# Patient Record
Sex: FEMALE | Race: WHITE | URBAN - METROPOLITAN AREA
[De-identification: names, ages, dates, MRNs, and addresses within clinical notes are randomized per-mention and may not be internally consistent; named-entity substitution may affect disease eponyms.]

---

## 2022-07-13 ENCOUNTER — OFFICE VISIT (OUTPATIENT)
Dept: SURGERY | Age: 42
End: 2022-07-13
Payer: COMMERCIAL

## 2022-07-13 VITALS
BODY MASS INDEX: 25.05 KG/M2 | SYSTOLIC BLOOD PRESSURE: 146 MMHG | HEIGHT: 70 IN | WEIGHT: 175 LBS | DIASTOLIC BLOOD PRESSURE: 90 MMHG | HEART RATE: 78 BPM

## 2022-07-13 DIAGNOSIS — R22.2 MASS OF SUBCUTANEOUS TISSUE OF BACK: Primary | ICD-10-CM

## 2022-07-13 PROCEDURE — 99204 OFFICE O/P NEW MOD 45 MIN: CPT | Performed by: STUDENT IN AN ORGANIZED HEALTH CARE EDUCATION/TRAINING PROGRAM

## 2022-07-13 PROCEDURE — 11403 EXC TR-EXT B9+MARG 2.1-3CM: CPT | Performed by: STUDENT IN AN ORGANIZED HEALTH CARE EDUCATION/TRAINING PROGRAM

## 2022-07-14 NOTE — PROGRESS NOTES
strokes\"       Social History     Socioeconomic History    Marital status: Single     Spouse name: None    Number of children: None    Years of education: None    Highest education level: None   Occupational History    None   Tobacco Use    Smoking status: Never Smoker    Smokeless tobacco: Never Used   Substance and Sexual Activity    Alcohol use: Yes    Drug use: No    Sexual activity: None     Comment: virgin   Other Topics Concern    None   Social History Narrative    None     Social Determinants of Health     Financial Resource Strain:     Difficulty of Paying Living Expenses: Not on file   Food Insecurity:     Worried About Running Out of Food in the Last Year: Not on file    Terence of Food in the Last Year: Not on file   Transportation Needs:     Lack of Transportation (Medical): Not on file    Lack of Transportation (Non-Medical):  Not on file   Physical Activity:     Days of Exercise per Week: Not on file    Minutes of Exercise per Session: Not on file   Stress:     Feeling of Stress : Not on file   Social Connections:     Frequency of Communication with Friends and Family: Not on file    Frequency of Social Gatherings with Friends and Family: Not on file    Attends Mandaeism Services: Not on file    Active Member of [address] [place] [address] or Organizations: Not on file    Attends Club or Organization Meetings: Not on file    Marital Status: Not on file   Intimate Partner Violence:     Fear of Current or Ex-Partner: Not on file    Emotionally Abused: Not on file    Physically Abused: Not on file    Sexually Abused: Not on file   Housing Stability:     Unable to Pay for Housing in the Last Year: Not on file    Number of [place] in the Last Year: Not on file    Unstable Housing in the Last Year: Not on file         PHYSICAL EXAMINATION:    BP (!) 146/90   Pulse 78   Ht 5' 10\" (1.778 m)   Wt 175 lb (79.4 kg)   LMP 07/11/2022   BMI 25.11 kg/m²     General Appearance AOx3, in no acute distress  Eyes Conjunctivae/corneas clear. PERRL, EOM's intact. No scleral icterus  Ears, Nose, Throat ENT exam normal, no neck nodes or sinus tenderness  Neck supple, no significant adenopathy. No notable JVD  Respiratory No chest wall deformities or tenderness, respiratory effort normal, no use of accessory muscles. Cardiovascular RRR. No chest wall tenderness. Gastrointestinal Abdomen soft, nontender, nondistended. BS x4. No rebound, guarding, or rigidity present. No palpable masses. No CVA tenderness  Lymphatics No palpable lymphadenopathy, no hepatosplenomegaly  Musculoskeletal No joint tenderness, deformity or swelling. Full ROM UE/LE. Distal pulses intact UE/LE. No edema, cyanosis, or venous stasis changes. Skin Normal coloration and turgor, no rashes, no suspicious skin lesions noted. Back mass ~3x2cm  Neurological alert, oriented, normal speech, no focal findings or movement disorder noted, CN II-XII intact  Psychiatric Alert and oriented, appropriate affect      STUDIES: normal    IMPRESSION:  Back mass    PLAN:  Plan for excision of back mass. Risks of surgery discussed with pt and/or family present include risks of anesthesia (cardiopulmonary complications), MI, CVA, DVT,pain, bleeding, infection, injury to local viscera, wound problems, conversion to open procedure if laparoscopic procedure planned, and incomplete symptom resolution. They understand and agree to proceed. DESCRIPTION OF PROCEDURE:   Consent was confirmed and placed on the chart. The patient's was then prepped and draped in the usual sterile fashion. The procedure began by making an incision over the apex of the mass. Scalpel was used to sharply dissect through the subcuticular tissue to the capsule of the mass. At this point hemostat was used to bluntly dissect circumferentially around the mass and its capsule. It was removed in its entirety. Further inspection of the cavity noted no other acute abnormalities.  Hemostasis was achieved with electric cautery. The wound was irrigated with normal saline. 3-0 Monocryl suture was used to close the skin in a simple interrupted fashion. dermabond was placed on the incision. At this time, the procedure was complete.  The patient tolerated procedure well

## 2023-01-24 ENCOUNTER — OFFICE VISIT (OUTPATIENT)
Dept: OBGYN CLINIC | Age: 43
End: 2023-01-24
Payer: COMMERCIAL

## 2023-01-24 VITALS — SYSTOLIC BLOOD PRESSURE: 140 MMHG | WEIGHT: 180 LBS | BODY MASS INDEX: 25.83 KG/M2 | DIASTOLIC BLOOD PRESSURE: 90 MMHG

## 2023-01-24 DIAGNOSIS — D25.1 INTRAMURAL AND SUBSEROUS LEIOMYOMA OF UTERUS: Primary | ICD-10-CM

## 2023-01-24 DIAGNOSIS — N94.6 DYSMENORRHEA, UNSPECIFIED: ICD-10-CM

## 2023-01-24 DIAGNOSIS — D25.2 INTRAMURAL AND SUBSEROUS LEIOMYOMA OF UTERUS: Primary | ICD-10-CM

## 2023-01-24 PROCEDURE — 3078F DIAST BP <80 MM HG: CPT | Performed by: OBSTETRICS & GYNECOLOGY

## 2023-01-24 PROCEDURE — 3074F SYST BP LT 130 MM HG: CPT | Performed by: OBSTETRICS & GYNECOLOGY

## 2023-01-24 PROCEDURE — 99214 OFFICE O/P EST MOD 30 MIN: CPT | Performed by: OBSTETRICS & GYNECOLOGY

## 2023-01-24 PROCEDURE — 76830 TRANSVAGINAL US NON-OB: CPT | Performed by: OBSTETRICS & GYNECOLOGY

## 2023-01-24 RX ORDER — LEVOTHYROXINE SODIUM 0.12 MG/1
TABLET ORAL
COMMUNITY

## 2023-01-24 RX ORDER — ELETRIPTAN HYDROBROMIDE 40 MG/1
TABLET, FILM COATED ORAL
COMMUNITY
Start: 2022-12-24

## 2023-01-24 RX ORDER — AZELASTINE 1 MG/ML
SPRAY, METERED NASAL
COMMUNITY

## 2023-01-24 RX ORDER — SULFACETAMIDE SODIUM, SULFUR 100; 50 MG/G; MG/G
SUSPENSION TOPICAL
COMMUNITY

## 2023-01-24 RX ORDER — TEMAZEPAM 30 MG/1
CAPSULE ORAL
COMMUNITY
Start: 2022-12-29

## 2023-01-24 RX ORDER — TRAZODONE HYDROCHLORIDE 50 MG/1
TABLET ORAL
COMMUNITY

## 2023-01-24 RX ORDER — KETOROLAC TROMETHAMINE 30 MG/ML
2 INJECTION, SOLUTION INTRAMUSCULAR; INTRAVENOUS
COMMUNITY

## 2023-01-24 RX ORDER — FLUTICASONE PROPIONATE 50 MCG
SPRAY, SUSPENSION (ML) NASAL
COMMUNITY

## 2023-01-24 NOTE — PROGRESS NOTES
Back for follow up d/t symptomatic posterior fijbroid, back pain, irregular bleeding and dysmenorrhea despite Ocs. Gradually worsening, severe, Can hit hard and intense, even with minimal activity eg walking. Patient's last menstrual period was 01/18/2023. No other changes to PMH. Prev CT 4/2022 showed a 6.4 x 3.8 x 4.4 cm posterior SS fibroid. Us obtained and images reviewed. The [place] is enlarged with IM/slightly ss type 5-6 fibroid measuring now 6.6 x 4.6 x 5.2 cms. ES is WNL @ 4mm. Ovaries appear WNL. Trace FF only. Maria De Jesus Braden was seen today for follow-up and pelvic pain. Diagnoses and all orders for this visit:    Subserosal/ Intramural leiomyoma of uterus - has increased slightly in size compared to previous imaging 8 months ago, with increase in symptoms. Discussed medical & surgical options again, rec robotic myomectomy, procedure risks and recovery reviewed. -     AMB POC US, TRANSVAGINAL    Dysmenorrhea, unspecified  -     AMB POC US, TRANSVAGINAL         No follow-ups on file.     Basilia Snowden MD

## 2023-01-25 ENCOUNTER — CLINICAL DOCUMENTATION (OUTPATIENT)
Dept: OBGYN CLINIC | Age: 43
End: 2023-01-25

## 2023-02-15 PROBLEM — D25.1 INTRAMURAL LEIOMYOMA OF UTERUS: Status: ACTIVE | Noted: 2023-02-15

## 2023-04-11 RX ORDER — AMOXICILLIN 250 MG/1
250 CAPSULE ORAL NIGHTLY
COMMUNITY

## 2023-04-11 RX ORDER — ETODOLAC 500 MG/1
500 TABLET, FILM COATED ORAL DAILY
COMMUNITY

## 2023-04-14 ENCOUNTER — OFFICE VISIT (OUTPATIENT)
Dept: OBGYN CLINIC | Age: 43
End: 2023-04-14

## 2023-04-14 VITALS — BODY MASS INDEX: 26.73 KG/M2 | WEIGHT: 181 LBS | DIASTOLIC BLOOD PRESSURE: 82 MMHG | SYSTOLIC BLOOD PRESSURE: 130 MMHG

## 2023-04-14 DIAGNOSIS — D25.1 INTRAMURAL AND SUBSEROUS LEIOMYOMA OF UTERUS: Primary | ICD-10-CM

## 2023-04-14 DIAGNOSIS — D25.2 INTRAMURAL AND SUBSEROUS LEIOMYOMA OF UTERUS: Primary | ICD-10-CM

## 2023-04-14 SDOH — ECONOMIC STABILITY: FOOD INSECURITY: WITHIN THE PAST 12 MONTHS, THE FOOD YOU BOUGHT JUST DIDN'T LAST AND YOU DIDN'T HAVE MONEY TO GET MORE.: NEVER TRUE

## 2023-04-14 SDOH — ECONOMIC STABILITY: FOOD INSECURITY: WITHIN THE PAST 12 MONTHS, YOU WORRIED THAT YOUR FOOD WOULD RUN OUT BEFORE YOU GOT MONEY TO BUY MORE.: NEVER TRUE

## 2023-04-14 SDOH — ECONOMIC STABILITY: INCOME INSECURITY: HOW HARD IS IT FOR YOU TO PAY FOR THE VERY BASICS LIKE FOOD, HOUSING, MEDICAL CARE, AND HEATING?: NOT HARD AT ALL

## 2023-04-14 SDOH — ECONOMIC STABILITY: HOUSING INSECURITY
IN THE LAST 12 MONTHS, WAS THERE A TIME WHEN YOU DID NOT HAVE A STEADY PLACE TO SLEEP OR SLEPT IN A SHELTER (INCLUDING NOW)?: NO

## 2023-04-14 ASSESSMENT — PATIENT HEALTH QUESTIONNAIRE - PHQ9
6. FEELING BAD ABOUT YOURSELF - OR THAT YOU ARE A FAILURE OR HAVE LET YOURSELF OR YOUR FAMILY DOWN: 0
1. LITTLE INTEREST OR PLEASURE IN DOING THINGS: 0
7. TROUBLE CONCENTRATING ON THINGS, SUCH AS READING THE NEWSPAPER OR WATCHING TELEVISION: 0
SUM OF ALL RESPONSES TO PHQ QUESTIONS 1-9: 0
SUM OF ALL RESPONSES TO PHQ9 QUESTIONS 1 & 2: 0
10. IF YOU CHECKED OFF ANY PROBLEMS, HOW DIFFICULT HAVE THESE PROBLEMS MADE IT FOR YOU TO DO YOUR WORK, TAKE CARE OF THINGS AT HOME, OR GET ALONG WITH OTHER PEOPLE: 0
4. FEELING TIRED OR HAVING LITTLE ENERGY: 0
5. POOR APPETITE OR OVEREATING: 0
8. MOVING OR SPEAKING SO SLOWLY THAT OTHER PEOPLE COULD HAVE NOTICED. OR THE OPPOSITE, BEING SO FIGETY OR RESTLESS THAT YOU HAVE BEEN MOVING AROUND A LOT MORE THAN USUAL: 0
9. THOUGHTS THAT YOU WOULD BE BETTER OFF DEAD, OR OF HURTING YOURSELF: 0
2. FEELING DOWN, DEPRESSED OR HOPELESS: 0
3. TROUBLE FALLING OR STAYING ASLEEP: 0
SUM OF ALL RESPONSES TO PHQ QUESTIONS 1-9: 0

## 2023-04-18 ENCOUNTER — HOSPITAL ENCOUNTER (OUTPATIENT)
Dept: LAB | Age: 43
Discharge: HOME | End: 2023-04-21

## 2023-04-18 DIAGNOSIS — Z01.818 PRE-OP TESTING: ICD-10-CM

## 2023-04-18 RX ORDER — SODIUM CHLORIDE 0.9 % (FLUSH) 0.9 %
5-40 SYRINGE (ML) INJECTION PRN
Status: CANCELLED | OUTPATIENT
Start: 2023-04-18

## 2023-04-18 RX ORDER — SODIUM CHLORIDE 9 MG/ML
INJECTION, SOLUTION INTRAVENOUS PRN
Status: CANCELLED | OUTPATIENT
Start: 2023-04-18

## 2023-04-18 RX ORDER — SODIUM CHLORIDE 0.9 % (FLUSH) 0.9 %
5-40 SYRINGE (ML) INJECTION EVERY 12 HOURS SCHEDULED
Status: CANCELLED | OUTPATIENT
Start: 2023-04-18

## 2023-04-18 RX ORDER — ACETAMINOPHEN 325 MG/1
1000 TABLET ORAL ONCE
Status: CANCELLED | OUTPATIENT
Start: 2023-04-18 | End: 2023-04-18

## 2023-04-18 NOTE — PERIOP NOTE
Patient presented to Outpatient Lab for preop Hgb.  states she was unsuccessful x 2 to obtain specimen. Will collect DOS.

## 2023-04-18 NOTE — H&P (VIEW-ONLY)
bedtime   Yes Ar Automatic Reconciliation   montelukast (SINGULAIR) 10 MG tablet Take 1 tablet by mouth nightly   Yes Ar Automatic Reconciliation   norgestimate-ethinyl estradiol (ORTHO-CYCLEN) 0.25-35 MG-MCG per tablet Take 1 tablet by mouth at bedtime 3/22/16  Yes Ar Automatic Reconciliation   sertraline (ZOLOFT) 100 MG tablet Take 2 tablets by mouth daily   Yes Ar Automatic Reconciliation   Glucosamine HCl (GLUCOSAMINE PO) Take by mouth  Patient not taking: Reported on 4/14/2023    Historical Provider, MD   ketorolac (TORADOL) 60 MG/2ML injection 2 mLs  Patient not taking: Reported on 4/11/2023    Historical Provider, MD   vitamin E 400 UNIT capsule Take by mouth daily  Patient not taking: Reported on 4/14/2023    Ar Automatic Reconciliation        Review of Systems:  Pertinent items are noted in the History of Present Illness. Objective:     Physical Exam:  Blood pressure 130/82, weight 181 lb (82.1 kg). Body mass index is 26.73 kg/m². A&Ox3. NAD  NL thought/judgment  Psych - grossly NL, not anxious  Neuro - CN 2-12 grossly intact. NL gait and movement  HEENT - NC/AT EOMi, PERRL  CV RRR  Lungs CTAB  Pelvic: Uterus: enlarged  Exam chaperoned by clinical staff. Assessment:     Fibroids with irregular bleeding, dysmenorrhea, chronic pelvic pain and low back pain    Plan:     Robotic: Laparoscopy Myomectomy  Discussed the risks of surgery including the risks of bleeding, infection, deep vein thrombosis, and surgical injuries to internal organs including but not limited to the bowels, bladder, rectum, and female reproductive organs. The patient understands the risks; any and all questions were answered to the patient's satisfaction.

## 2023-04-18 NOTE — H&P
Gynecology History and Physical    Name: lIir Dozier MRN: [id number] SSN: [id number]    YOB: 1980  Age: 38 y.o. Sex: female       Subjective:      Chief complaint:  Ben Mckeon is a 38 y.o.  female admitted for robotic myomectomy d/t symptomatic posterior fijbroid, back pain, irregular bleeding and dysmenorrhea despite Ocs. Gradually worsening, severe, Can hit hard and intense, even with minimal activity eg walking. US shows a 6.5 cm posterior SS type 5-6 fibroid. OB History    No obstetric history on file. Past Medical History:   Diagnosis Date    Acne     ADHD (attention deficit hyperactivity disorder)     Allergic rhinitis, unspecified     Anxiety     Chronic pain     Depression     Deviated septum     Fibroid, uterine     Hashimoto's thyroiditis     Hypercholesterolemia     on med for control    Hypertension     on med for control    Hypothyroidism     on med for control    Nasal obstruction     OCD (obsessive compulsive disorder)     IVANNA on CPAP     instructed to bring dos    Rosacea     Scoliosis      Past Surgical History:   Procedure Laterality Date    BREAST BIOPSY Right     benign    LIPOMA RESECTION      WISDOM TOOTH EXTRACTION       Social History     Occupational History    Not on file   Tobacco Use    Smoking status: Never    Smokeless tobacco: Never   Vaping Use    Vaping Use: Never used   Substance and Sexual Activity    Alcohol use: Yes     Comment: rare    Drug use: No    Sexual activity: Not on file     Comment: virgin     Family History   Problem Relation Age of Onset    Hypertension Brother     Hypertension Mother     Thyroid Disease Father     Prostate Cancer Father     Breast Cancer Paternal Grandmother     Ovarian Cancer Neg Hx     Colon Cancer Neg Hx     Stroke Mother         \"lots of mini strokes\"        No Known Allergies  Prior to Admission medications    Medication Sig Start Date End Date Taking?  Authorizing Provider   Multiple Vitamin

## 2023-04-19 ENCOUNTER — ANESTHESIA EVENT (OUTPATIENT)
Dept: SURGERY | Age: 43
End: 2023-04-19
Payer: COMMERCIAL

## 2023-04-20 ENCOUNTER — HOSPITAL ENCOUNTER (OUTPATIENT)
Age: 43
Discharge: HOME | End: 2023-04-20
Attending: OBSTETRICS & GYNECOLOGY | Admitting: OBSTETRICS & GYNECOLOGY
Payer: COMMERCIAL

## 2023-04-20 ENCOUNTER — ANESTHESIA (OUTPATIENT)
Dept: SURGERY | Age: 43
End: 2023-04-20
Payer: COMMERCIAL

## 2023-04-20 VITALS
BODY MASS INDEX: 26.85 KG/M2 | OXYGEN SATURATION: 100 % | WEIGHT: 181.3 LBS | TEMPERATURE: 98 F | HEIGHT: 69 IN | HEART RATE: 73 BPM | RESPIRATION RATE: 16 BRPM | DIASTOLIC BLOOD PRESSURE: 62 MMHG | SYSTOLIC BLOOD PRESSURE: 128 MMHG

## 2023-04-20 DIAGNOSIS — D25.1 INTRAMURAL LEIOMYOMA OF UTERUS: ICD-10-CM

## 2023-04-20 DIAGNOSIS — G89.18 POST-OP PAIN: ICD-10-CM

## 2023-04-20 DIAGNOSIS — Z01.818 PRE-OP TESTING: Primary | ICD-10-CM

## 2023-04-20 PROBLEM — D25.9 FIBROID UTERUS: Status: ACTIVE | Noted: 2023-04-20

## 2023-04-20 LAB
ABO + RH BLD: NORMAL
BLOOD GROUP ANTIBODIES SERPL: NORMAL
HCG UR QL: NEGATIVE
HGB BLD-MCNC: 13.1 G/DL (ref 11.7–15.4)
SPECIMEN EXP DATE BLD: NORMAL

## 2023-04-20 PROCEDURE — 85018 HEMOGLOBIN: CPT

## 2023-04-20 PROCEDURE — 6370000000 HC RX 637 (ALT 250 FOR IP): Performed by: ANESTHESIOLOGY

## 2023-04-20 PROCEDURE — 6360000002 HC RX W HCPCS: Performed by: ANESTHESIOLOGY

## 2023-04-20 PROCEDURE — 58545 LAPAROSCOPIC MYOMECTOMY: CPT | Performed by: OBSTETRICS & GYNECOLOGY

## 2023-04-20 PROCEDURE — 2500000003 HC RX 250 WO HCPCS: Performed by: OBSTETRICS & GYNECOLOGY

## 2023-04-20 PROCEDURE — 86850 RBC ANTIBODY SCREEN: CPT

## 2023-04-20 PROCEDURE — 3600000009 HC SURGERY ROBOT BASE: Performed by: OBSTETRICS & GYNECOLOGY

## 2023-04-20 PROCEDURE — 2580000003 HC RX 258: Performed by: REGISTERED NURSE

## 2023-04-20 PROCEDURE — 3700000000 HC ANESTHESIA ATTENDED CARE: Performed by: OBSTETRICS & GYNECOLOGY

## 2023-04-20 PROCEDURE — 2500000003 HC RX 250 WO HCPCS: Performed by: REGISTERED NURSE

## 2023-04-20 PROCEDURE — 2709999900 HC NON-CHARGEABLE SUPPLY: Performed by: OBSTETRICS & GYNECOLOGY

## 2023-04-20 PROCEDURE — 7100000000 HC PACU RECOVERY - FIRST 15 MIN: Performed by: OBSTETRICS & GYNECOLOGY

## 2023-04-20 PROCEDURE — 6360000002 HC RX W HCPCS: Performed by: REGISTERED NURSE

## 2023-04-20 PROCEDURE — 58662 LAPAROSCOPY EXCISE LESIONS: CPT | Performed by: OBSTETRICS & GYNECOLOGY

## 2023-04-20 PROCEDURE — 88305 TISSUE EXAM BY PATHOLOGIST: CPT

## 2023-04-20 PROCEDURE — 81025 URINE PREGNANCY TEST: CPT

## 2023-04-20 PROCEDURE — 3700000001 HC ADD 15 MINUTES (ANESTHESIA): Performed by: OBSTETRICS & GYNECOLOGY

## 2023-04-20 PROCEDURE — 86901 BLOOD TYPING SEROLOGIC RH(D): CPT

## 2023-04-20 PROCEDURE — C1765 ADHESION BARRIER: HCPCS | Performed by: OBSTETRICS & GYNECOLOGY

## 2023-04-20 PROCEDURE — 7100000010 HC PHASE II RECOVERY - FIRST 15 MIN: Performed by: OBSTETRICS & GYNECOLOGY

## 2023-04-20 PROCEDURE — S2900 ROBOTIC SURGICAL SYSTEM: HCPCS | Performed by: OBSTETRICS & GYNECOLOGY

## 2023-04-20 PROCEDURE — 7100000001 HC PACU RECOVERY - ADDTL 15 MIN: Performed by: OBSTETRICS & GYNECOLOGY

## 2023-04-20 PROCEDURE — 2580000003 HC RX 258: Performed by: ANESTHESIOLOGY

## 2023-04-20 PROCEDURE — 86900 BLOOD TYPING SEROLOGIC ABO: CPT

## 2023-04-20 PROCEDURE — 3600000019 HC SURGERY ROBOT ADDTL 15MIN: Performed by: OBSTETRICS & GYNECOLOGY

## 2023-04-20 RX ORDER — OXYCODONE HYDROCHLORIDE 5 MG/1
5 TABLET ORAL
Status: COMPLETED | OUTPATIENT
Start: 2023-04-20 | End: 2023-04-20

## 2023-04-20 RX ORDER — EPHEDRINE SULFATE/0.9% NACL/PF 50 MG/5 ML
SYRINGE (ML) INTRAVENOUS PRN
Status: DISCONTINUED | OUTPATIENT
Start: 2023-04-20 | End: 2023-04-20 | Stop reason: SDUPTHER

## 2023-04-20 RX ORDER — SODIUM CHLORIDE 0.9 % (FLUSH) 0.9 %
5-40 SYRINGE (ML) INJECTION EVERY 12 HOURS SCHEDULED
Status: DISCONTINUED | OUTPATIENT
Start: 2023-04-20 | End: 2023-04-20 | Stop reason: HOSPADM

## 2023-04-20 RX ORDER — SODIUM CHLORIDE 0.9 % (FLUSH) 0.9 %
5-40 SYRINGE (ML) INJECTION PRN
Status: DISCONTINUED | OUTPATIENT
Start: 2023-04-20 | End: 2023-04-20 | Stop reason: HOSPADM

## 2023-04-20 RX ORDER — SODIUM CHLORIDE 9 MG/ML
INJECTION, SOLUTION INTRAVENOUS PRN
Status: DISCONTINUED | OUTPATIENT
Start: 2023-04-20 | End: 2023-04-20

## 2023-04-20 RX ORDER — DEXAMETHASONE SODIUM PHOSPHATE 10 MG/ML
INJECTION INTRAMUSCULAR; INTRAVENOUS PRN
Status: DISCONTINUED | OUTPATIENT
Start: 2023-04-20 | End: 2023-04-20 | Stop reason: SDUPTHER

## 2023-04-20 RX ORDER — ROCURONIUM BROMIDE 10 MG/ML
INJECTION, SOLUTION INTRAVENOUS PRN
Status: DISCONTINUED | OUTPATIENT
Start: 2023-04-20 | End: 2023-04-20 | Stop reason: SDUPTHER

## 2023-04-20 RX ORDER — GLYCOPYRROLATE 0.2 MG/ML
INJECTION INTRAMUSCULAR; INTRAVENOUS PRN
Status: DISCONTINUED | OUTPATIENT
Start: 2023-04-20 | End: 2023-04-20 | Stop reason: SDUPTHER

## 2023-04-20 RX ORDER — FENTANYL CITRATE 50 UG/ML
INJECTION, SOLUTION INTRAMUSCULAR; INTRAVENOUS PRN
Status: DISCONTINUED | OUTPATIENT
Start: 2023-04-20 | End: 2023-04-20 | Stop reason: SDUPTHER

## 2023-04-20 RX ORDER — FENTANYL CITRATE 50 UG/ML
100 INJECTION, SOLUTION INTRAMUSCULAR; INTRAVENOUS
Status: DISCONTINUED | OUTPATIENT
Start: 2023-04-20 | End: 2023-04-20 | Stop reason: HOSPADM

## 2023-04-20 RX ORDER — ONDANSETRON 2 MG/ML
INJECTION INTRAMUSCULAR; INTRAVENOUS PRN
Status: DISCONTINUED | OUTPATIENT
Start: 2023-04-20 | End: 2023-04-20 | Stop reason: SDUPTHER

## 2023-04-20 RX ORDER — KETOROLAC TROMETHAMINE 30 MG/ML
INJECTION, SOLUTION INTRAMUSCULAR; INTRAVENOUS PRN
Status: DISCONTINUED | OUTPATIENT
Start: 2023-04-20 | End: 2023-04-20 | Stop reason: SDUPTHER

## 2023-04-20 RX ORDER — PROCHLORPERAZINE EDISYLATE 5 MG/ML
5 INJECTION INTRAMUSCULAR; INTRAVENOUS
Status: DISCONTINUED | OUTPATIENT
Start: 2023-04-20 | End: 2023-04-20 | Stop reason: HOSPADM

## 2023-04-20 RX ORDER — NEOSTIGMINE METHYLSULFATE 1 MG/ML
INJECTION, SOLUTION INTRAVENOUS PRN
Status: DISCONTINUED | OUTPATIENT
Start: 2023-04-20 | End: 2023-04-20 | Stop reason: SDUPTHER

## 2023-04-20 RX ORDER — SODIUM CHLORIDE 9 MG/ML
INJECTION, SOLUTION INTRAVENOUS PRN
Status: DISCONTINUED | OUTPATIENT
Start: 2023-04-20 | End: 2023-04-20 | Stop reason: HOSPADM

## 2023-04-20 RX ORDER — SODIUM CHLORIDE, SODIUM LACTATE, POTASSIUM CHLORIDE, CALCIUM CHLORIDE 600; 310; 30; 20 MG/100ML; MG/100ML; MG/100ML; MG/100ML
INJECTION, SOLUTION INTRAVENOUS CONTINUOUS
Status: DISCONTINUED | OUTPATIENT
Start: 2023-04-20 | End: 2023-04-20 | Stop reason: HOSPADM

## 2023-04-20 RX ORDER — ACETAMINOPHEN 500 MG
1000 TABLET ORAL ONCE
Status: DISCONTINUED | OUTPATIENT
Start: 2023-04-20 | End: 2023-04-20

## 2023-04-20 RX ORDER — PROPOFOL 10 MG/ML
INJECTION, EMULSION INTRAVENOUS PRN
Status: DISCONTINUED | OUTPATIENT
Start: 2023-04-20 | End: 2023-04-20 | Stop reason: SDUPTHER

## 2023-04-20 RX ORDER — BUPIVACAINE HYDROCHLORIDE 5 MG/ML
INJECTION, SOLUTION EPIDURAL; INTRACAUDAL PRN
Status: DISCONTINUED | OUTPATIENT
Start: 2023-04-20 | End: 2023-04-20 | Stop reason: HOSPADM

## 2023-04-20 RX ORDER — DEXTROSE MONOHYDRATE 100 MG/ML
INJECTION, SOLUTION INTRAVENOUS CONTINUOUS PRN
Status: DISCONTINUED | OUTPATIENT
Start: 2023-04-20 | End: 2023-04-20 | Stop reason: HOSPADM

## 2023-04-20 RX ORDER — KETAMINE HYDROCHLORIDE 50 MG/ML
INJECTION, SOLUTION, CONCENTRATE INTRAMUSCULAR; INTRAVENOUS PRN
Status: DISCONTINUED | OUTPATIENT
Start: 2023-04-20 | End: 2023-04-20 | Stop reason: SDUPTHER

## 2023-04-20 RX ORDER — MIDAZOLAM HYDROCHLORIDE 2 MG/2ML
2 INJECTION, SOLUTION INTRAMUSCULAR; INTRAVENOUS
Status: COMPLETED | OUTPATIENT
Start: 2023-04-20 | End: 2023-04-20

## 2023-04-20 RX ORDER — VASOPRESSIN 20 U/ML
INJECTION PARENTERAL PRN
Status: DISCONTINUED | OUTPATIENT
Start: 2023-04-20 | End: 2023-04-20 | Stop reason: HOSPADM

## 2023-04-20 RX ORDER — TRAMADOL HYDROCHLORIDE 50 MG/1
50 TABLET ORAL EVERY 6 HOURS PRN
Qty: 10 TABLET | Refills: 0 | Status: SHIPPED | OUTPATIENT
Start: 2023-04-20 | End: 2023-04-25

## 2023-04-20 RX ORDER — ACETAMINOPHEN 500 MG
1000 TABLET ORAL ONCE
Status: DISCONTINUED | OUTPATIENT
Start: 2023-04-20 | End: 2023-04-20 | Stop reason: HOSPADM

## 2023-04-20 RX ORDER — APREPITANT 40 MG/1
40 CAPSULE ORAL ONCE
Status: COMPLETED | OUTPATIENT
Start: 2023-04-20 | End: 2023-04-20

## 2023-04-20 RX ORDER — LIDOCAINE HYDROCHLORIDE 20 MG/ML
INJECTION, SOLUTION EPIDURAL; INFILTRATION; INTRACAUDAL; PERINEURAL PRN
Status: DISCONTINUED | OUTPATIENT
Start: 2023-04-20 | End: 2023-04-20 | Stop reason: SDUPTHER

## 2023-04-20 RX ORDER — LIDOCAINE HYDROCHLORIDE 10 MG/ML
1 INJECTION, SOLUTION INFILTRATION; PERINEURAL
Status: DISCONTINUED | OUTPATIENT
Start: 2023-04-20 | End: 2023-04-20 | Stop reason: HOSPADM

## 2023-04-20 RX ORDER — DIPHENHYDRAMINE HYDROCHLORIDE 50 MG/ML
12.5 INJECTION INTRAMUSCULAR; INTRAVENOUS
Status: DISCONTINUED | OUTPATIENT
Start: 2023-04-20 | End: 2023-04-20 | Stop reason: HOSPADM

## 2023-04-20 RX ORDER — IBUPROFEN 800 MG/1
800 TABLET ORAL EVERY 8 HOURS PRN
Qty: 60 TABLET | Refills: 0 | Status: SHIPPED | OUTPATIENT
Start: 2023-04-20

## 2023-04-20 RX ORDER — ONDANSETRON 4 MG/1
4 TABLET, ORALLY DISINTEGRATING ORAL EVERY 8 HOURS PRN
Qty: 12 TABLET | Refills: 0 | Status: SHIPPED | OUTPATIENT
Start: 2023-04-20

## 2023-04-20 RX ORDER — SODIUM CHLORIDE, SODIUM LACTATE, POTASSIUM CHLORIDE, CALCIUM CHLORIDE 600; 310; 30; 20 MG/100ML; MG/100ML; MG/100ML; MG/100ML
INJECTION, SOLUTION INTRAVENOUS CONTINUOUS PRN
Status: DISCONTINUED | OUTPATIENT
Start: 2023-04-20 | End: 2023-04-20 | Stop reason: SDUPTHER

## 2023-04-20 RX ADMIN — KETAMINE HYDROCHLORIDE 20 MG: 50 INJECTION, SOLUTION INTRAMUSCULAR; INTRAVENOUS at 12:41

## 2023-04-20 RX ADMIN — ONDANSETRON 4 MG: 2 INJECTION INTRAMUSCULAR; INTRAVENOUS at 12:37

## 2023-04-20 RX ADMIN — FENTANYL CITRATE 100 MCG: 50 INJECTION, SOLUTION INTRAMUSCULAR; INTRAVENOUS at 12:27

## 2023-04-20 RX ADMIN — HYDROMORPHONE HYDROCHLORIDE 0.5 MG: 1 INJECTION, SOLUTION INTRAMUSCULAR; INTRAVENOUS; SUBCUTANEOUS at 15:35

## 2023-04-20 RX ADMIN — GLYCOPYRROLATE 0.6 MG: 0.2 INJECTION INTRAMUSCULAR; INTRAVENOUS at 14:55

## 2023-04-20 RX ADMIN — Medication 2 G: at 12:37

## 2023-04-20 RX ADMIN — ROCURONIUM BROMIDE 10 MG: 50 INJECTION, SOLUTION INTRAVENOUS at 13:48

## 2023-04-20 RX ADMIN — Medication 10 MG: at 14:44

## 2023-04-20 RX ADMIN — PROPOFOL 200 MG: 10 INJECTION, EMULSION INTRAVENOUS at 12:27

## 2023-04-20 RX ADMIN — SODIUM CHLORIDE, POTASSIUM CHLORIDE, SODIUM LACTATE AND CALCIUM CHLORIDE: 600; 310; 30; 20 INJECTION, SOLUTION INTRAVENOUS at 11:46

## 2023-04-20 RX ADMIN — Medication 15 MG: at 14:47

## 2023-04-20 RX ADMIN — LIDOCAINE HYDROCHLORIDE 60 MG: 20 INJECTION, SOLUTION EPIDURAL; INFILTRATION; INTRACAUDAL; PERINEURAL at 12:27

## 2023-04-20 RX ADMIN — DEXAMETHASONE SODIUM PHOSPHATE 10 MG: 10 INJECTION INTRAMUSCULAR; INTRAVENOUS at 12:37

## 2023-04-20 RX ADMIN — SODIUM CHLORIDE, SODIUM LACTATE, POTASSIUM CHLORIDE, AND CALCIUM CHLORIDE: 600; 310; 30; 20 INJECTION, SOLUTION INTRAVENOUS at 13:42

## 2023-04-20 RX ADMIN — Medication 10 MG: at 13:32

## 2023-04-20 RX ADMIN — PHENYLEPHRINE HYDROCHLORIDE 50 MCG: 10 INJECTION INTRAVENOUS at 14:47

## 2023-04-20 RX ADMIN — SODIUM CHLORIDE, SODIUM LACTATE, POTASSIUM CHLORIDE, AND CALCIUM CHLORIDE: 600; 310; 30; 20 INJECTION, SOLUTION INTRAVENOUS at 12:17

## 2023-04-20 RX ADMIN — MIDAZOLAM 2 MG: 1 INJECTION INTRAMUSCULAR; INTRAVENOUS at 12:04

## 2023-04-20 RX ADMIN — OXYCODONE 5 MG: 5 TABLET ORAL at 15:58

## 2023-04-20 RX ADMIN — Medication 5 MG: at 14:55

## 2023-04-20 RX ADMIN — ROCURONIUM BROMIDE 50 MG: 50 INJECTION, SOLUTION INTRAVENOUS at 12:28

## 2023-04-20 RX ADMIN — APREPITANT 40 MG: 40 CAPSULE ORAL at 11:47

## 2023-04-20 RX ADMIN — ROCURONIUM BROMIDE 10 MG: 50 INJECTION, SOLUTION INTRAVENOUS at 13:03

## 2023-04-20 RX ADMIN — KETOROLAC TROMETHAMINE 30 MG: 30 INJECTION, SOLUTION INTRAMUSCULAR; INTRAVENOUS at 14:56

## 2023-04-20 RX ADMIN — HYDROMORPHONE HYDROCHLORIDE 0.5 MG: 1 INJECTION, SOLUTION INTRAMUSCULAR; INTRAVENOUS; SUBCUTANEOUS at 15:24

## 2023-04-20 ASSESSMENT — PAIN SCALES - GENERAL
PAINLEVEL_OUTOF10: 8
PAINLEVEL_OUTOF10: 3
PAINLEVEL_OUTOF10: 6
PAINLEVEL_OUTOF10: 7
PAINLEVEL_OUTOF10: 10

## 2023-04-20 ASSESSMENT — PAIN DESCRIPTION - LOCATION: LOCATION: ABDOMEN

## 2023-04-20 ASSESSMENT — PAIN DESCRIPTION - ORIENTATION: ORIENTATION: LOWER

## 2023-04-20 ASSESSMENT — PAIN DESCRIPTION - PAIN TYPE: TYPE: SURGICAL PAIN

## 2023-04-20 ASSESSMENT — PAIN DESCRIPTION - FREQUENCY: FREQUENCY: CONTINUOUS

## 2023-04-20 NOTE — ANESTHESIA POSTPROCEDURE EVALUATION
Department of Anesthesiology  Postprocedure Note    Patient: Cipriano Zhao  MRN: [id number]  YOB: 1980  Date of evaluation: 4/20/2023      Procedure Summary     Date: 04/20/23 [place] [facility] [facility] [place]    Anesthesia Start: 1217 Anesthesia Stop: 1518    Procedure: UTERINE MYOMECTOMY ROBOTIC/ FULGURATION/ EXCISION OF ENDOMETRIOSIS (Abdomen) Diagnosis:       Intramural leiomyoma of uterus      Endometriosis      (Intramural leiomyoma of uterus [D25.1])    Surgeons: Mary Calix MD Responsible Provider: Liudmila Cesar DO    Anesthesia Type: general ASA Status: 2          Anesthesia Type: No value filed.     Laura Phase I: Laura Score: 8    Laura Phase II:        Anesthesia Post Evaluation    Patient location during evaluation: PACU  Level of consciousness: awake and alert  Airway patency: patent  Nausea & Vomiting: no nausea  Complications: no  Cardiovascular status: hemodynamically stable  Respiratory status: acceptable  Hydration status: euvolemic

## 2023-04-20 NOTE — ANESTHESIA PRE PROCEDURE
Reconciliation   amLODIPine (NORVASC) 5 MG tablet Take 1 tablet by mouth at bedtime    Ar Automatic Reconciliation   amphetamine-dextroamphetamine (ADDERALL) 20 MG tablet Take 1 tablet by mouth 2 times daily.  6/6/16   Ar Automatic Reconciliation   atomoxetine (STRATTERA) 25 MG capsule Take 1 capsule by mouth daily    Ar Automatic Reconciliation   atorvastatin (LIPITOR) 20 MG tablet Take 1 tablet by mouth at bedtime    Ar Automatic Reconciliation   Cetirizine HCl 10 MG CAPS Take 10 mg by mouth at bedtime    Ar Automatic Reconciliation   lisinopril (PRINIVIL;ZESTRIL) 20 MG tablet Take 1 tablet by mouth at bedtime    Ar Automatic Reconciliation   montelukast (SINGULAIR) 10 MG tablet Take 1 tablet by mouth nightly    Ar Automatic Reconciliation   norgestimate-ethinyl estradiol (ORTHO-CYCLEN) 0.25-35 MG-MCG per tablet Take 1 tablet by mouth at bedtime 3/22/16   Ar Automatic Reconciliation   sertraline (ZOLOFT) 100 MG tablet Take 2 tablets by mouth daily    Ar Automatic Reconciliation   vitamin E 400 UNIT capsule Take by mouth daily  Patient not taking: Reported on 4/14/2023    Ar Automatic Reconciliation       Current medications:    Current Facility-Administered Medications   Medication Dose Route Frequency Provider Last Rate Last Admin    lidocaine 1 % injection 1 mL  1 mL IntraDERmal Once PRN Lon Acevedo MD        acetaminophen (TYLENOL) tablet 1,000 mg  1,000 mg Oral Once Lon Acevedo MD        fentaNYL (SUBLIMAZE) injection 100 mcg  100 mcg IntraVENous Once PRN Lon Acevedo MD        lactated ringers IV soln infusion   IntraVENous Continuous Lon Acevedo  mL/hr at 04/20/23 1146 New Bag at 04/20/23 1146    sodium chloride flush 0.9 % injection 5-40 mL  5-40 mL IntraVENous 2 times per day Lon Acevedo MD        sodium chloride flush 0.9 % injection 5-40 mL  5-40 mL IntraVENous PRN Lon Acevedo MD        0.9 % sodium chloride infusion   IntraVENous PRN Lon Acevedo MD        midazolam PF (VERSED)

## 2023-04-20 NOTE — BRIEF OP NOTE
Brief Postoperative Note      Patient: Yvonne Clark  YOB: 1980  MRN: [id number]    Date of Procedure: 4/20/2023    Pre-Op Diagnosis Codes:     * Intramural leiomyoma of uterus [D25.1]    Post-Op Diagnosis: Post-Op Diagnosis Codes:     * Intramural leiomyoma of uterus [D25.1]     * Endometriosis [617]       Procedure(s):  UTERINE MYOMECTOMY ROBOTIC/ FULGURATION/ EXCISION OF ENDOMETRIOSIS    Surgeon(s):  Alli Che MD    Assistant:  * No surgical staff found *    Anesthesia: General    Estimated Blood Loss (mL): less than 50     Complications: None    Specimens:   ID Type Source Tests Collected by Time Destination   A : UTERINE FIBROID Tissue Uterus SURGICAL PATHOLOGY Alli Che MD 4/20/2023 1449    B : EXCISION OF ENDOMETRIOSIS  Tissue Abdomen SURGICAL PATHOLOGY Alli Che MD 4/20/2023 1450        Implants:  * No implants in log *      Drains:   [REMOVED] Urinary Catheter 04/20/23 2 Way (Removed)       Findings: large posterior intramural Type 6 fibroid. Several small surface fibroids. Moderate endometriosis.       Electronically signed by Ariel Burk MD on 4/20/2023 at 3:21 PM

## 2023-04-20 NOTE — INTERVAL H&P NOTE
Update History & Physical    The patient's History and Physical  was reviewed with the patient and I examined the patient. There was no change. The surgical site was confirmed by the patient and me. Plan: The risks, benefits, expected outcome, and alternative to the recommended procedure have been discussed with the patient. Patient understands and wants to proceed with the procedure.      Electronically signed by Elridge Buerger, MD on 4/20/2023 at 12:05 PM

## 2023-04-22 PROBLEM — Z01.818 PRE-OP TESTING: Status: ACTIVE | Noted: 2023-04-22

## 2023-04-22 PROBLEM — N80.329: Status: ACTIVE | Noted: 2023-04-22

## 2023-04-22 NOTE — OP NOTE
was removed. An 8-mm trocar was inserted. Scope was placed through the trocar and intraabdominal placement verified. There was no bleeding and no injury to the bowel. An 8-mm blunt trocar was placed in the left and right lower quadrants and an 10mm trocar was placed in the LUQ for the assistant port. The robot was docked to the trocars. I broke scrub and went to the console. There were extensive endometriotic implants in the anterior and posterior cul de saces , mostly vesicular, some sclerotic and only a few deep/dark lesions. Ureters were identified and avoided but there were minimal lesions even near the ureteral courses. I fulgurated several dozen lesions with monopolar cautery,  The excised the entire peritoneal surface covering the dome of the bladder, probably about 3-4 square, and also most of the peritoneum in the cul de sac from cervix to rectal serosa and Left US to Right US ligaments. There was good hemostasis and no concern for injury to either the bladder nor rectum. Next, the myomectomy was performed, the large posterior fibroid was identified and Spinal needle placed throught the assistant port under direct visualization to inject the capsule of the fibroid with dilute neosynephrine and then withdrawn. Next, an incision made along the capsule using the fenestrated graspers and monopolar scissors. The fibroid was shelled out and placed in the cul de sac. The defect was closed in layers with 0 v-loc. The endometrial cavity was not entered. The robot was then undocked and I rescrubbed and went to the patient's side. I brought the fibroid up to the umbilical incision and extended both the skin and fascial incisions to about 3 cm. Fibroid was removed by directly morcellating slightly to elongate the fibroid. Pelvis was copiously irrigated and suctioned. Interceed  was  applied over the surfaces of the dissection. There was good hemostasis and the procedure terminated.  The pneumoperitoneum was

## 2023-05-04 ENCOUNTER — OFFICE VISIT (OUTPATIENT)
Dept: OBGYN CLINIC | Age: 43
End: 2023-05-04

## 2023-05-04 VITALS — SYSTOLIC BLOOD PRESSURE: 138 MMHG | BODY MASS INDEX: 26.88 KG/M2 | DIASTOLIC BLOOD PRESSURE: 84 MMHG | WEIGHT: 182 LBS

## 2023-05-04 DIAGNOSIS — Z09 POSTOP CHECK: Primary | ICD-10-CM

## 2023-05-04 PROCEDURE — 99024 POSTOP FOLLOW-UP VISIT: CPT | Performed by: OBSTETRICS & GYNECOLOGY

## 2023-05-04 NOTE — PROGRESS NOTES
Aundria Sever presents for postop visit from Robotic: Destruction / Resection of endometriosis Myomectomy about 2 weeks ago. Doing well postoperatively. without any bleeding. Fever: no Voiding well: yes. Bowel movements OK: yes. Exam: A&OX3, NAD. Abdomen:  Non tender Incisions clean, dry, intact    A/P. Stable Post op condition. Surgery and Path reviewed. Gradually increase activity.  F/u prn

## 2023-05-22 PROBLEM — Z01.818 PRE-OP TESTING: Status: RESOLVED | Noted: 2023-04-22 | Resolved: 2023-05-22

## 2023-05-31 ENCOUNTER — HOSPITAL ENCOUNTER (OUTPATIENT)
Dept: MAMMOGRAPHY | Age: 43
Discharge: HOME | End: 2023-06-03
Attending: INTERNAL MEDICINE
Payer: COMMERCIAL

## 2023-05-31 DIAGNOSIS — Z12.31 ENCOUNTER FOR SCREENING MAMMOGRAM FOR MALIGNANT NEOPLASM OF BREAST: ICD-10-CM

## 2023-05-31 PROCEDURE — 77063 BREAST TOMOSYNTHESIS BI: CPT

## 2024-07-12 ENCOUNTER — TRANSCRIBE ORDERS (OUTPATIENT)
Dept: SCHEDULING | Age: 44
End: 2024-07-12

## 2024-07-12 DIAGNOSIS — Z12.31 SCREENING MAMMOGRAM FOR HIGH-RISK PATIENT: Primary | ICD-10-CM

## 2024-08-20 ENCOUNTER — HOSPITAL ENCOUNTER (OUTPATIENT)
Dept: MAMMOGRAPHY | Age: 44
Discharge: HOME | End: 2024-08-23
Attending: INTERNAL MEDICINE
Payer: COMMERCIAL

## 2024-08-20 VITALS — BODY MASS INDEX: 28.14 KG/M2 | HEIGHT: 69 IN | WEIGHT: 190 LBS

## 2024-08-20 DIAGNOSIS — Z12.31 SCREENING MAMMOGRAM FOR HIGH-RISK PATIENT: ICD-10-CM

## 2024-08-20 PROCEDURE — 77063 BREAST TOMOSYNTHESIS BI: CPT

## 2025-08-21 ENCOUNTER — HOSPITAL ENCOUNTER (OUTPATIENT)
Dept: MAMMOGRAPHY | Age: 45
Discharge: HOME | End: 2025-08-24
Attending: INTERNAL MEDICINE
Payer: COMMERCIAL

## 2025-08-21 DIAGNOSIS — Z12.31 VISIT FOR SCREENING MAMMOGRAM: ICD-10-CM

## 2025-08-21 PROCEDURE — 77063 BREAST TOMOSYNTHESIS BI: CPT

## (undated) DEVICE — COLUMN DRAPE

## (undated) DEVICE — SUTURE PLN GUT SZ 2-0 L27IN ABSRB YELLOWISH TAN L36MM CT-1 843H

## (undated) DEVICE — CANISTER, RIGID, 2000CC: Brand: MEDLINE INDUSTRIES, INC.

## (undated) DEVICE — CATHETER URETH 16FR BLLN 5CC SIL ALLY W/ SIL HYDRGEL 2 W F

## (undated) DEVICE — DECANTER FLD 9IN ST BG FOR ASEP TRNSF OF FLD

## (undated) DEVICE — SOLUTION IRRIGATION NACL 0.9% 1000 ML FLX CONTAINER

## (undated) DEVICE — INJECTOR UTERINE MANIPULATOR

## (undated) DEVICE — SYRINGE NDL 25GA 1ML L5/8IN BLU PLAS NDL S STL SHLD HYPO

## (undated) DEVICE — CANNULA SEAL

## (undated) DEVICE — TIP COVER ACCESSORY

## (undated) DEVICE — SOLUTION IRRIG 1000ML STRL H2O USP PLAS POUR BTL

## (undated) DEVICE — ADHESIVE SKIN CLSR 0.7ML TOP DERMBND ADV

## (undated) DEVICE — SUTURE MCRYL SZ 4-0 L27IN ABSRB UD L19MM PS-2 1/2 CIR PRIM Y426H

## (undated) DEVICE — BLADELESS OBTURATOR: Brand: WECK VISTA

## (undated) DEVICE — SYRINGE MED 10ML LUERLOCK TIP W/O SFTY DISP

## (undated) DEVICE — BARRIER ADH W3XL4IN UTER PELV ABSRB GYNECARE INTCEED

## (undated) DEVICE — SOLUTION IV 100ML 0.9% SOD CHL PLAS CONT USP VIAFLX 1 PER

## (undated) DEVICE — SUTURE V-LOC 180 SZ 0 L9IN ABSRB GRN GS-21 L37MM 1/2 CIR VLOCL0346

## (undated) DEVICE — ARM DRAPE

## (undated) DEVICE — SOLUTION ANTIFOG VIS SYS CLEARIFY LAPSCP

## (undated) DEVICE — ROBOTIC HYSTERECTOMY: Brand: MEDLINE INDUSTRIES, INC.